# Patient Record
Sex: FEMALE | Race: ASIAN | Employment: OTHER | URBAN - METROPOLITAN AREA
[De-identification: names, ages, dates, MRNs, and addresses within clinical notes are randomized per-mention and may not be internally consistent; named-entity substitution may affect disease eponyms.]

---

## 2020-06-07 ENCOUNTER — HOSPITAL ENCOUNTER (INPATIENT)
Age: 85
LOS: 1 days | Discharge: NURSING, REHAB OR LONG-TERM CARE | DRG: 552 | End: 2020-06-09
Attending: EMERGENCY MEDICINE | Admitting: INTERNAL MEDICINE
Payer: MEDICARE

## 2020-06-07 ENCOUNTER — APPOINTMENT (OUTPATIENT)
Dept: CT IMAGING | Age: 85
DRG: 552 | End: 2020-06-07
Payer: MEDICARE

## 2020-06-07 ENCOUNTER — APPOINTMENT (OUTPATIENT)
Dept: GENERAL RADIOLOGY | Age: 85
DRG: 552 | End: 2020-06-07
Payer: MEDICARE
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
## 2020-06-07 LAB
ABSOLUTE EOS #: 0.07 K/UL (ref 0–0.44)
ABSOLUTE IMMATURE GRANULOCYTE: 0.08 K/UL (ref 0–0.3)
ABSOLUTE LYMPH #: 2.86 K/UL (ref 1.1–3.7)
ABSOLUTE MONO #: 0.44 K/UL (ref 0.1–1.2)
ANION GAP SERPL CALCULATED.3IONS-SCNC: 14 MMOL/L (ref 9–17)
BASOPHILS # BLD: 0 % (ref 0–2)
BASOPHILS ABSOLUTE: 0.03 K/UL (ref 0–0.2)
BUN BLDV-MCNC: 28 MG/DL (ref 8–23)
BUN/CREAT BLD: 44 (ref 9–20)
CALCIUM SERPL-MCNC: 9.1 MG/DL (ref 8.6–10.4)
CHLORIDE BLD-SCNC: 100 MMOL/L (ref 98–107)
CO2: 27 MMOL/L (ref 20–31)
CREAT SERPL-MCNC: 0.64 MG/DL (ref 0.5–0.9)
DIFFERENTIAL TYPE: ABNORMAL
EOSINOPHILS RELATIVE PERCENT: 1 % (ref 1–4)
GFR AFRICAN AMERICAN: >60 ML/MIN
GFR NON-AFRICAN AMERICAN: >60 ML/MIN
GFR SERPL CREATININE-BSD FRML MDRD: ABNORMAL ML/MIN/{1.73_M2}
GFR SERPL CREATININE-BSD FRML MDRD: ABNORMAL ML/MIN/{1.73_M2}
GLUCOSE BLD-MCNC: 112 MG/DL (ref 70–99)
HCT VFR BLD CALC: 37.8 % (ref 36.3–47.1)
HEMOGLOBIN: 12 G/DL (ref 11.9–15.1)
IMMATURE GRANULOCYTES: 1 %
INR BLD: 1
LYMPHOCYTES # BLD: 37 % (ref 24–43)
MCH RBC QN AUTO: 31 PG (ref 25.2–33.5)
MCHC RBC AUTO-ENTMCNC: 31.7 G/DL (ref 28.4–34.8)
MCV RBC AUTO: 97.7 FL (ref 82.6–102.9)
MONOCYTES # BLD: 6 % (ref 3–12)
NRBC AUTOMATED: 0 PER 100 WBC
PDW BLD-RTO: 13.1 % (ref 11.8–14.4)
PLATELET # BLD: 226 K/UL (ref 138–453)
PLATELET ESTIMATE: ABNORMAL
PMV BLD AUTO: 9.1 FL (ref 8.1–13.5)
POTASSIUM SERPL-SCNC: 3.8 MMOL/L (ref 3.7–5.3)
PROTHROMBIN TIME: 12.6 SEC (ref 11.5–14.2)
RBC # BLD: 3.87 M/UL (ref 3.95–5.11)
RBC # BLD: ABNORMAL 10*6/UL
SEG NEUTROPHILS: 55 % (ref 36–65)
SEGMENTED NEUTROPHILS ABSOLUTE COUNT: 4.17 K/UL (ref 1.5–8.1)
SODIUM BLD-SCNC: 141 MMOL/L (ref 135–144)
WBC # BLD: 7.7 K/UL (ref 3.5–11.3)
WBC # BLD: ABNORMAL 10*3/UL

## 2020-06-07 PROCEDURE — 72170 X-RAY EXAM OF PELVIS: CPT

## 2020-06-07 PROCEDURE — 0HQ1XZZ REPAIR FACE SKIN, EXTERNAL APPROACH: ICD-10-PCS | Performed by: EMERGENCY MEDICINE

## 2020-06-07 PROCEDURE — 71250 CT THORAX DX C-: CPT

## 2020-06-07 PROCEDURE — G0378 HOSPITAL OBSERVATION PER HR: HCPCS

## 2020-06-07 PROCEDURE — 72125 CT NECK SPINE W/O DYE: CPT

## 2020-06-07 PROCEDURE — 96372 THER/PROPH/DIAG INJ SC/IM: CPT

## 2020-06-07 PROCEDURE — 99219 PR INITIAL OBSERVATION CARE/DAY 50 MINUTES: CPT | Performed by: INTERNAL MEDICINE

## 2020-06-07 PROCEDURE — 73700 CT LOWER EXTREMITY W/O DYE: CPT

## 2020-06-07 PROCEDURE — 73030 X-RAY EXAM OF SHOULDER: CPT

## 2020-06-07 PROCEDURE — 2580000003 HC RX 258: Performed by: EMERGENCY MEDICINE

## 2020-06-07 PROCEDURE — 71045 X-RAY EXAM CHEST 1 VIEW: CPT

## 2020-06-07 PROCEDURE — 70486 CT MAXILLOFACIAL W/O DYE: CPT

## 2020-06-07 PROCEDURE — 70450 CT HEAD/BRAIN W/O DYE: CPT

## 2020-06-07 PROCEDURE — 6370000000 HC RX 637 (ALT 250 FOR IP): Performed by: NURSE PRACTITIONER

## 2020-06-07 PROCEDURE — 96361 HYDRATE IV INFUSION ADD-ON: CPT

## 2020-06-07 PROCEDURE — 96374 THER/PROPH/DIAG INJ IV PUSH: CPT

## 2020-06-07 PROCEDURE — 80048 BASIC METABOLIC PNL TOTAL CA: CPT

## 2020-06-07 PROCEDURE — 2580000003 HC RX 258: Performed by: NURSE PRACTITIONER

## 2020-06-07 PROCEDURE — 90715 TDAP VACCINE 7 YRS/> IM: CPT | Performed by: EMERGENCY MEDICINE

## 2020-06-07 PROCEDURE — APPNB15 APP NON BILLABLE TIME 0-15 MINS: Performed by: NURSE PRACTITIONER

## 2020-06-07 PROCEDURE — 90471 IMMUNIZATION ADMIN: CPT | Performed by: EMERGENCY MEDICINE

## 2020-06-07 PROCEDURE — 96375 TX/PRO/DX INJ NEW DRUG ADDON: CPT

## 2020-06-07 PROCEDURE — 6360000002 HC RX W HCPCS: Performed by: NURSE PRACTITIONER

## 2020-06-07 PROCEDURE — 99285 EMERGENCY DEPT VISIT HI MDM: CPT

## 2020-06-07 PROCEDURE — 6360000002 HC RX W HCPCS: Performed by: EMERGENCY MEDICINE

## 2020-06-07 PROCEDURE — 85610 PROTHROMBIN TIME: CPT

## 2020-06-07 PROCEDURE — 85025 COMPLETE CBC W/AUTO DIFF WBC: CPT

## 2020-06-07 RX ORDER — POLYETHYLENE GLYCOL 3350 17 G/17G
17 POWDER, FOR SOLUTION ORAL DAILY PRN
COMMUNITY

## 2020-06-07 RX ORDER — CALCIUM CARBONATE/VITAMIN D3 600 MG-10
1 TABLET ORAL 2 TIMES DAILY
Status: DISCONTINUED | OUTPATIENT
Start: 2020-06-07 | End: 2020-06-09 | Stop reason: HOSPADM

## 2020-06-07 RX ORDER — PROMETHAZINE HYDROCHLORIDE 12.5 MG/1
12.5 TABLET ORAL EVERY 6 HOURS PRN
Status: DISCONTINUED | OUTPATIENT
Start: 2020-06-07 | End: 2020-06-09 | Stop reason: HOSPADM

## 2020-06-07 RX ORDER — ONDANSETRON 2 MG/ML
4 INJECTION INTRAMUSCULAR; INTRAVENOUS EVERY 6 HOURS PRN
Status: DISCONTINUED | OUTPATIENT
Start: 2020-06-07 | End: 2020-06-09 | Stop reason: HOSPADM

## 2020-06-07 RX ORDER — POTASSIUM CHLORIDE 7.45 MG/ML
10 INJECTION INTRAVENOUS PRN
Status: DISCONTINUED | OUTPATIENT
Start: 2020-06-07 | End: 2020-06-09 | Stop reason: HOSPADM

## 2020-06-07 RX ORDER — SODIUM CHLORIDE 0.9 % (FLUSH) 0.9 %
10 SYRINGE (ML) INJECTION PRN
Status: DISCONTINUED | OUTPATIENT
Start: 2020-06-07 | End: 2020-06-09 | Stop reason: HOSPADM

## 2020-06-07 RX ORDER — VITAMIN B COMPLEX
2000 TABLET ORAL DAILY
Status: DISCONTINUED | OUTPATIENT
Start: 2020-06-07 | End: 2020-06-09 | Stop reason: HOSPADM

## 2020-06-07 RX ORDER — POTASSIUM CHLORIDE 20 MEQ/1
40 TABLET, EXTENDED RELEASE ORAL PRN
Status: DISCONTINUED | OUTPATIENT
Start: 2020-06-07 | End: 2020-06-09 | Stop reason: HOSPADM

## 2020-06-07 RX ORDER — ONDANSETRON 2 MG/ML
4 INJECTION INTRAMUSCULAR; INTRAVENOUS ONCE
Status: COMPLETED | OUTPATIENT
Start: 2020-06-07 | End: 2020-06-07

## 2020-06-07 RX ORDER — HYDROCODONE BITARTRATE AND ACETAMINOPHEN 5; 325 MG/1; MG/1
1 TABLET ORAL EVERY 4 HOURS PRN
Status: DISCONTINUED | OUTPATIENT
Start: 2020-06-07 | End: 2020-06-09 | Stop reason: HOSPADM

## 2020-06-07 RX ORDER — SODIUM CHLORIDE 0.9 % (FLUSH) 0.9 %
10 SYRINGE (ML) INJECTION EVERY 12 HOURS SCHEDULED
Status: DISCONTINUED | OUTPATIENT
Start: 2020-06-07 | End: 2020-06-09 | Stop reason: HOSPADM

## 2020-06-07 RX ORDER — SODIUM CHLORIDE 9 MG/ML
INJECTION, SOLUTION INTRAVENOUS CONTINUOUS
Status: DISCONTINUED | OUTPATIENT
Start: 2020-06-07 | End: 2020-06-07

## 2020-06-07 RX ORDER — ACETAMINOPHEN 325 MG/1
650 TABLET ORAL EVERY 6 HOURS PRN
Status: DISCONTINUED | OUTPATIENT
Start: 2020-06-07 | End: 2020-06-09 | Stop reason: HOSPADM

## 2020-06-07 RX ORDER — ACETAMINOPHEN 160 MG
2000 TABLET,DISINTEGRATING ORAL DAILY PRN
COMMUNITY

## 2020-06-07 RX ORDER — ACETAMINOPHEN 650 MG/1
650 SUPPOSITORY RECTAL EVERY 6 HOURS PRN
Status: DISCONTINUED | OUTPATIENT
Start: 2020-06-07 | End: 2020-06-09 | Stop reason: HOSPADM

## 2020-06-07 RX ORDER — OYSTER SHELL CALCIUM WITH VITAMIN D 500; 200 MG/1; [IU]/1
1 TABLET, FILM COATED ORAL 2 TIMES DAILY PRN
COMMUNITY

## 2020-06-07 RX ORDER — FENTANYL CITRATE 50 UG/ML
25 INJECTION, SOLUTION INTRAMUSCULAR; INTRAVENOUS ONCE
Status: COMPLETED | OUTPATIENT
Start: 2020-06-07 | End: 2020-06-07

## 2020-06-07 RX ORDER — FENTANYL CITRATE 50 UG/ML
25 INJECTION, SOLUTION INTRAMUSCULAR; INTRAVENOUS
Status: DISCONTINUED | OUTPATIENT
Start: 2020-06-07 | End: 2020-06-09 | Stop reason: HOSPADM

## 2020-06-07 RX ORDER — HYDROCODONE BITARTRATE AND ACETAMINOPHEN 5; 325 MG/1; MG/1
2 TABLET ORAL EVERY 4 HOURS PRN
Status: DISCONTINUED | OUTPATIENT
Start: 2020-06-07 | End: 2020-06-09 | Stop reason: HOSPADM

## 2020-06-07 RX ORDER — POLYETHYLENE GLYCOL 3350 17 G/17G
17 POWDER, FOR SOLUTION ORAL DAILY PRN
Status: DISCONTINUED | OUTPATIENT
Start: 2020-06-07 | End: 2020-06-09 | Stop reason: HOSPADM

## 2020-06-07 RX ORDER — NICOTINE 21 MG/24HR
1 PATCH, TRANSDERMAL 24 HOURS TRANSDERMAL DAILY PRN
Status: DISCONTINUED | OUTPATIENT
Start: 2020-06-07 | End: 2020-06-09 | Stop reason: HOSPADM

## 2020-06-07 RX ORDER — MAGNESIUM SULFATE 1 G/100ML
1 INJECTION INTRAVENOUS PRN
Status: DISCONTINUED | OUTPATIENT
Start: 2020-06-07 | End: 2020-06-09 | Stop reason: HOSPADM

## 2020-06-07 RX ADMIN — HYDROCODONE BITARTRATE AND ACETAMINOPHEN 1 TABLET: 5; 325 TABLET ORAL at 09:18

## 2020-06-07 RX ADMIN — SODIUM CHLORIDE, PRESERVATIVE FREE 10 ML: 5 INJECTION INTRAVENOUS at 20:11

## 2020-06-07 RX ADMIN — Medication 1 TABLET: at 20:10

## 2020-06-07 RX ADMIN — FENTANYL CITRATE 25 MCG: 50 INJECTION, SOLUTION INTRAMUSCULAR; INTRAVENOUS at 02:54

## 2020-06-07 RX ADMIN — Medication 1 TABLET: at 09:18

## 2020-06-07 RX ADMIN — SODIUM CHLORIDE: 9 INJECTION, SOLUTION INTRAVENOUS at 03:03

## 2020-06-07 RX ADMIN — MELATONIN 2000 UNITS: at 09:18

## 2020-06-07 RX ADMIN — TETANUS TOXOID, REDUCED DIPHTHERIA TOXOID AND ACELLULAR PERTUSSIS VACCINE, ADSORBED 0.5 ML: 5; 2.5; 8; 8; 2.5 SUSPENSION INTRAMUSCULAR at 04:20

## 2020-06-07 RX ADMIN — ENOXAPARIN SODIUM 30 MG: 30 INJECTION SUBCUTANEOUS at 15:58

## 2020-06-07 RX ADMIN — ONDANSETRON 4 MG: 2 INJECTION INTRAMUSCULAR; INTRAVENOUS at 02:54

## 2020-06-07 RX ADMIN — SODIUM CHLORIDE, PRESERVATIVE FREE 10 ML: 5 INJECTION INTRAVENOUS at 16:01

## 2020-06-07 RX ADMIN — HYDROCODONE BITARTRATE AND ACETAMINOPHEN 1 TABLET: 5; 325 TABLET ORAL at 15:58

## 2020-06-07 ASSESSMENT — PAIN DESCRIPTION - PROGRESSION: CLINICAL_PROGRESSION: NOT CHANGED

## 2020-06-07 ASSESSMENT — PAIN DESCRIPTION - LOCATION: LOCATION: SHOULDER

## 2020-06-07 ASSESSMENT — PAIN DESCRIPTION - FREQUENCY: FREQUENCY: CONTINUOUS

## 2020-06-07 ASSESSMENT — PAIN - FUNCTIONAL ASSESSMENT: PAIN_FUNCTIONAL_ASSESSMENT: PREVENTS OR INTERFERES WITH ALL ACTIVE AND SOME PASSIVE ACTIVITIES

## 2020-06-07 ASSESSMENT — PAIN SCALES - GENERAL
PAINLEVEL_OUTOF10: 7
PAINLEVEL_OUTOF10: 6
PAINLEVEL_OUTOF10: 8

## 2020-06-07 ASSESSMENT — PAIN DESCRIPTION - DESCRIPTORS: DESCRIPTORS: SHARP;POUNDING

## 2020-06-07 ASSESSMENT — PAIN DESCRIPTION - ORIENTATION: ORIENTATION: RIGHT

## 2020-06-07 ASSESSMENT — PAIN DESCRIPTION - ONSET: ONSET: ON-GOING

## 2020-06-07 ASSESSMENT — PAIN DESCRIPTION - PAIN TYPE: TYPE: ACUTE PAIN

## 2020-06-07 NOTE — ED PROVIDER NOTES
[address] [place] [address] ED  eMERGENCY dEPARTMENT eNCOUnter      Pt Name: Isac Gracia  MRN: [id number]  [place] 11/6/1927  Date of evaluation: 6/7/2020  Provider: Bianka Soni MD    CHIEF COMPLAINT       Chief Complaint   Patient presents with    Fall    Laceration         HISTORY OF PRESENT ILLNESS  (Location/Symptom, Timing/Onset, Context/Setting, Quality, Duration, Modifying Factors, Severity.)   Viki Arroyo is a 80 y.o. female who presents to the emergency department for evaluation of injuries sustained when she fell. Patient lives in a private dwelling with family. She was up this morning when she slipped and fell. She denies syncope presyncope. She states she fell and struck her face and head. She also has pain to the right shoulder and posterior chest wall region. The laceration on her forehead and has a small amount of bleeding      Nursing Notes were reviewed. ALLERGIES     Patient has no known allergies. CURRENT MEDICATIONS       Previous Medications    CALCIUM-VITAMIN D (OSCAL-500) 500-200 MG-UNIT PER TABLET    Take 1 tablet by mouth 2 times daily    CHOLECALCIFEROL (VITAMIN D3) 50 MCG (2000 UT) CAPS    Take 2,000 Units by mouth daily    POTASSIUM CHLORIDE CRISTIAN ER (K-DUR PO)    Take 20 mEq by mouth daily       PAST MEDICAL HISTORY         Diagnosis Date    Debility     Hiatal hernia     Osteoarthritis of left knee     Osteoporosis     Sensorineural hearing loss     Small bowel obstruction ([address]     Urinary tract infection with hematuria        SURGICAL HISTORY           Procedure Laterality Date    APPENDECTOMY      HYSTERECTOMY      MASTECTOMY Right          FAMILY HISTORY     History reviewed. No pertinent family history. No family status information on file. SOCIAL HISTORY      reports that she has never smoked. She has never used smokeless tobacco. She reports previous alcohol use. She reports that she does not use drugs.     REVIEW OF SYSTEMS    (2-9 systems for level 4, 10 or more for level 5)     Review of Systems   All other systems reviewed and are negative. Except as noted above the remainder of the review of systems was reviewed and negative. PHYSICAL EXAM    (up to 7 for level 4, 8 or more for level 5)     Vitals:    06/07/20 0229   BP: (!) 155/78   Pulse: 72   Resp: 18   Temp: 98.2 °F (36.8 °C)   TempSrc: Oral   SpO2: 93%   Weight: 90 lb (40.8 kg)   Height: 4' 8\" (1.422 m)       Physical exam reflects a pleasant elderly female. She is afebrile with stable vital signs to include pulse ox of 93% on room air. She is not hypoxic. She is alert conversive and appropriate in behavior. She has significant facial contusions on the right side of her face. She has a 2 and half centimeter laceration/skin tear horizontal in orientation on the right side of the mid forehead. No ocular injury. No nasal malalignment epistaxis otorrhea or rhinorrhea. No hemotympanum she has severe kyphosis. She is tender on palpation of the right scapula right shoulder region diffusely. She will not move her right arm. No bruising to the chest wall. Heart regular rate and rhythm normal S1-S2 no murmurs rubs gallops. Lungs are clear although diminished abdomen is soft throughout pelvis is stable no focal hip discomfort. Left upper extremity and bilateral lower extremities show no deformities bony point tenderness decreased range of motion or complaint of pain    DIAGNOSTIC RESULTS         RADIOLOGY:   Non-plain film images such as CT, Ultrasound and MRI are read by the radiologist. Plain radiographic images are visualized and preliminarily interpreted by the emergency physician with the below findings:      Interpretation per the Radiologist below, if available at the time of this note:    CT Head WO Contrast   Final Result   Atrophy without acute intracranial abnormality. Right forehead soft tissue swelling and laceration.          CT Cervical Spine WO Contrast   Final Result

## 2020-06-07 NOTE — H&P
[facility]    HISTORY AND PHYSICAL EXAMINATION            Date:   6/7/2020  Patient name:  Jose Smith  Date of admission:  6/7/2020  2:29 AM  MRN:   [id number]  Account:  [de-identified]  YOB: 1927  PCP:    Nuiba Arnold MD  Room:   2001/2001-02  Code Status:    Full Code    Chief Complaint:     Chief Complaint   Patient presents with    Fall    Laceration       History Obtained From:     patient, family member - niece, electronic medical record    History of Present Illness:     Viki Toledo is a 80 y.o. Non-/non  female who presents with Fall and Laceration   and is admitted to the hospital for the management of Fall at home, initial encounter. This 80 yof was warming up coffee in the microwave and lost her balance and fell to ground. She sustained scapular fracture, and possibly dens fracture too. Has pain all over, especially in right shoulder    Past Medical History:     Past Medical History:   Diagnosis Date    Debility     Hiatal hernia     Osteoarthritis of left knee     Osteoporosis     Sensorineural hearing loss     Small bowel obstruction ([address]     Urinary tract infection with hematuria         Past Surgical History:     Past Surgical History:   Procedure Laterality Date    APPENDECTOMY      HYSTERECTOMY      MASTECTOMY Right         Medications Prior to Admission:     Prior to Admission medications    Medication Sig Start Date End Date Taking?  Authorizing Provider   Potassium Chloride Chen ER (K-DUR PO) Take 20 mEq by mouth daily   Yes Historical Provider, MD   calcium-vitamin D (OSCAL-500) 500-200 MG-UNIT per tablet Take 1 tablet by mouth 2 times daily   Yes Historical Provider, MD   Cholecalciferol (VITAMIN D3) 50 MCG (2000 UT) CAPS Take 2,000 Units by mouth daily   Yes Historical Provider, MD   polyethylene glycol (GLYCOLAX) 17 g packet Take 17 g by mouth daily   Yes Historical Provider, MD Allergies:     Patient has no known allergies. Social History:     Tobacco:    reports that she has never smoked. She has never used smokeless tobacco.  Alcohol:      reports previous alcohol use. Drug Use:  reports no history of drug use. Family History:     History reviewed. No pertinent family history. Review of Systems:     Positive and Negative as described in HPI. CONSTITUTIONAL:  negative for fevers, chills, sweats, fatigue, weight loss  HEENT:  negative for vision, hearing changes, runny nose, throat pain  RESPIRATORY:  negative for shortness of breath, cough, congestion, wheezing  CARDIOVASCULAR:  negative for chest pain, palpitations  GASTROINTESTINAL:  negative for nausea, vomiting, diarrhea, constipation, change in bowel habits, abdominal pain   GENITOURINARY:  negative for difficulty of urination, burning with urination, frequency   INTEGUMENT:  negative for rash, skin lesions, easy bruising   HEMATOLOGIC/LYMPHATIC:  negative for swelling/edema   ALLERGIC/IMMUNOLOGIC:  negative for urticaria , itching  ENDOCRINE:  negative increase in drinking, increase in urination, hot or cold intolerance  MUSCULOSKELETAL:  negative swelling of joints  NEUROLOGICAL:  negative for headaches, dizziness, lightheadedness, numbness, tingling extremities  BEHAVIOR/PSYCH:  negative for depression, anxiety    Physical Exam:   BP (!) 141/68   Pulse 66   Temp 97.6 °F (36.4 °C) (Oral)   Resp 16   Ht 4' 8\" (1.422 m)   Wt 79 lb (35.8 kg)   SpO2 96%   BMI 17.71 kg/m²   Temp (24hrs), Av.9 °F (36.6 °C), Min:97.6 °F (36.4 °C), Max:98.2 °F (36.8 °C)    No results for input(s): POCGLU in the last 72 hours.     Intake/Output Summary (Last 24 hours) at 2020 1311  Last data filed at 2020 1034  Gross per 24 hour   Intake 1200 ml   Output 300 ml   Net 900 ml       General Appearance:  alert, well appearing, and in no acute distress  Mental status: oriented to person, place, and time  Head:  normocephalic, NOT REPORTED    Basic Metabolic Panel    Collection Time: 06/07/20  2:59 AM   Result Value Ref Range    Glucose 112 (H) 70 - 99 mg/dL    BUN 28 (H) 8 - 23 mg/dL    CREATININE 0.64 0.50 - 0.90 mg/dL    Bun/Cre Ratio 44 (H) 9 - 20    Calcium 9.1 8.6 - 10.4 mg/dL    Sodium 141 135 - 144 mmol/L    Potassium 3.8 3.7 - 5.3 mmol/L    Chloride 100 98 - 107 mmol/L    CO2 27 20 - 31 mmol/L    Anion Gap 14 9 - 17 mmol/L    GFR Non-African American >60 >60 mL/min    GFR African American >60 >60 mL/min    GFR Comment          GFR Staging NOT REPORTED    Protime-INR    Collection Time: 06/07/20  2:59 AM   Result Value Ref Range    Protime 12.6 11.5 - 14.2 sec    INR 1.0        Imaging/Diagnostics:  Xr Pelvis (1-2 Views)    Result Date: 6/7/2020  Likely impacted fracture involving the left femoral neck. Dedicated left hip views may be helpful in further evaluation. Xr Shoulder Right (min 2 Views)    Result Date: 6/7/2020  Age-indeterminate fractures involving the right 3rd, 4th, and 5th ribs. Comminuted fracture involving the inferior aspect of the right scapula. Ct Head Wo Contrast    Result Date: 6/7/2020  Atrophy without acute intracranial abnormality. Right forehead soft tissue swelling and laceration. Ct Facial Bones Wo Contrast    Result Date: 6/7/2020  No acute traumatic injury of the facial bones. Nondisplaced fracture at the tip of the dens. Ct Chest Wo Contrast    Addendum Date: 6/7/2020    ADDENDUM: There is redemonstration of age-indeterminate right 3rd, 4th, and 5th rib fractures. There is also redemonstration of a comminuted right scapular fracture inferiorly. Result Date: 6/7/2020  Atelectasis in the lung bases. Large hiatal hernia. Remote compression deformity at L1 status post kyphoplasty. Age-indeterminate T5 compression deformity. Ct Cervical Spine Wo Contrast    Result Date: 6/7/2020  Potential nondisplaced fracture at the tip of the dens.      Xr Chest Portable    Result Date: 6/7/2020  Chronic pulmonary change without acute cardiopulmonary process. Hiatal hernia. Ct Hip Left Wo Contrast    Result Date: 6/7/2020  No acute osseous or soft tissue abnormality. Degenerative change of the SI joints and left hip joint. Assessment :      Hospital Problems           Last Modified POA    * (Principal) Fall at home, initial encounter 6/7/2020 Yes    Dens fracture, closed, initial encounter ([facility] [address] 6/7/2020 Yes    Overview Signed 6/7/2020  6:39 AM by CORINNE Tee CNP     chronic         Closed fracture of right scapula 6/7/2020 Yes    Closed fracture of multiple ribs 6/7/2020 Yes                Plan:     Patient status observation in the Med/Surge    1. Ortho consult of scapula fracture  2. NS consult for possible dens fracture, likely needs c collar  3. D/w niece  4. Pain control  5.  Hold off on pt/ot until cleared by ortho and NS    Consultations:   IP CONSULT TO INTERNAL MEDICINE  IP CONSULT TO SOCIAL WORK        Jolinda Closs Blood,   6/7/2020  1:11 PM    Copy sent to Dr. Ross Aguilar MD

## 2020-06-07 NOTE — PROGRESS NOTES
Talked with niece, Leticia Lewis who says she is POA, answered some questions to help with completion of the history admission, also reminded to bring in pt's living will/classification paperwork and the POA papers and she voiced  understanding

## 2020-06-07 NOTE — PROGRESS NOTES
Talked with Odalis Yang and gave condition update, made her aware of the new consults for  neuro surgery and ortho

## 2020-06-08 LAB
ANION GAP SERPL CALCULATED.3IONS-SCNC: 13 MMOL/L (ref 9–17)
BUN BLDV-MCNC: 20 MG/DL (ref 8–23)
BUN/CREAT BLD: 34 (ref 9–20)
CALCIUM SERPL-MCNC: 9.4 MG/DL (ref 8.6–10.4)
CHLORIDE BLD-SCNC: 101 MMOL/L (ref 98–107)
CO2: 26 MMOL/L (ref 20–31)
CREAT SERPL-MCNC: 0.58 MG/DL (ref 0.5–0.9)
GFR AFRICAN AMERICAN: >60 ML/MIN
GFR NON-AFRICAN AMERICAN: >60 ML/MIN
GFR SERPL CREATININE-BSD FRML MDRD: ABNORMAL ML/MIN/{1.73_M2}
GFR SERPL CREATININE-BSD FRML MDRD: ABNORMAL ML/MIN/{1.73_M2}
GLUCOSE BLD-MCNC: 110 MG/DL (ref 70–99)
HCT VFR BLD CALC: 38.3 % (ref 36.3–47.1)
HEMOGLOBIN: 12.5 G/DL (ref 11.9–15.1)
INR BLD: 1
MCH RBC QN AUTO: 31.6 PG (ref 25.2–33.5)
MCHC RBC AUTO-ENTMCNC: 32.6 G/DL (ref 28.4–34.8)
MCV RBC AUTO: 96.7 FL (ref 82.6–102.9)
NRBC AUTOMATED: 0 PER 100 WBC
PDW BLD-RTO: 12.9 % (ref 11.8–14.4)
PLATELET # BLD: 202 K/UL (ref 138–453)
PMV BLD AUTO: 8.7 FL (ref 8.1–13.5)
POTASSIUM SERPL-SCNC: 3.9 MMOL/L (ref 3.7–5.3)
PROTHROMBIN TIME: 13.1 SEC (ref 11.5–14.2)
RBC # BLD: 3.96 M/UL (ref 3.95–5.11)
SARS-COV-2, PCR: NORMAL
SARS-COV-2, RAPID: NOT DETECTED
SARS-COV-2: NORMAL
SODIUM BLD-SCNC: 140 MMOL/L (ref 135–144)
SOURCE: NORMAL
WBC # BLD: 8.4 K/UL (ref 3.5–11.3)

## 2020-06-08 PROCEDURE — 85027 COMPLETE CBC AUTOMATED: CPT

## 2020-06-08 PROCEDURE — 6360000002 HC RX W HCPCS: Performed by: NURSE PRACTITIONER

## 2020-06-08 PROCEDURE — U0002 COVID-19 LAB TEST NON-CDC: HCPCS

## 2020-06-08 PROCEDURE — G0378 HOSPITAL OBSERVATION PER HR: HCPCS

## 2020-06-08 PROCEDURE — 80048 BASIC METABOLIC PNL TOTAL CA: CPT

## 2020-06-08 PROCEDURE — 6370000000 HC RX 637 (ALT 250 FOR IP): Performed by: NURSE PRACTITIONER

## 2020-06-08 PROCEDURE — 99225 PR SBSQ OBSERVATION CARE/DAY 25 MINUTES: CPT | Performed by: INTERNAL MEDICINE

## 2020-06-08 PROCEDURE — 96372 THER/PROPH/DIAG INJ SC/IM: CPT

## 2020-06-08 PROCEDURE — 85610 PROTHROMBIN TIME: CPT

## 2020-06-08 PROCEDURE — 99221 1ST HOSP IP/OBS SF/LOW 40: CPT | Performed by: PHYSICIAN ASSISTANT

## 2020-06-08 PROCEDURE — 2580000003 HC RX 258: Performed by: NURSE PRACTITIONER

## 2020-06-08 PROCEDURE — 36415 COLL VENOUS BLD VENIPUNCTURE: CPT

## 2020-06-08 RX ORDER — ACETAMINOPHEN 160 MG/5ML
650 SOLUTION ORAL EVERY 4 HOURS PRN
Status: DISCONTINUED | OUTPATIENT
Start: 2020-06-08 | End: 2020-06-09 | Stop reason: HOSPADM

## 2020-06-08 RX ADMIN — SODIUM CHLORIDE, PRESERVATIVE FREE 10 ML: 5 INJECTION INTRAVENOUS at 13:19

## 2020-06-08 RX ADMIN — Medication 1 TABLET: at 21:00

## 2020-06-08 RX ADMIN — POLYETHYLENE GLYCOL (3350) 17 G: 17 POWDER, FOR SOLUTION ORAL at 22:40

## 2020-06-08 RX ADMIN — Medication 1 TABLET: at 12:33

## 2020-06-08 RX ADMIN — ENOXAPARIN SODIUM 30 MG: 30 INJECTION SUBCUTANEOUS at 16:01

## 2020-06-08 RX ADMIN — ACETAMINOPHEN 650 MG: 325 SOLUTION ORAL at 13:18

## 2020-06-08 RX ADMIN — SODIUM CHLORIDE, PRESERVATIVE FREE 10 ML: 5 INJECTION INTRAVENOUS at 21:01

## 2020-06-08 RX ADMIN — HYDROCODONE BITARTRATE AND ACETAMINOPHEN 2 TABLET: 5; 325 TABLET ORAL at 22:40

## 2020-06-08 RX ADMIN — MELATONIN 2000 UNITS: at 12:33

## 2020-06-08 ASSESSMENT — PAIN DESCRIPTION - PAIN TYPE
TYPE: CHRONIC PAIN
TYPE: ACUTE PAIN

## 2020-06-08 ASSESSMENT — PAIN SCALES - GENERAL
PAINLEVEL_OUTOF10: 3
PAINLEVEL_OUTOF10: 0
PAINLEVEL_OUTOF10: 8

## 2020-06-08 ASSESSMENT — PAIN DESCRIPTION - FREQUENCY
FREQUENCY: INTERMITTENT
FREQUENCY: INTERMITTENT

## 2020-06-08 ASSESSMENT — PAIN DESCRIPTION - ONSET
ONSET: ON-GOING
ONSET: ON-GOING

## 2020-06-08 ASSESSMENT — PAIN DESCRIPTION - LOCATION
LOCATION: SHOULDER
LOCATION: BACK

## 2020-06-08 ASSESSMENT — PAIN DESCRIPTION - DESCRIPTORS
DESCRIPTORS: ACHING
DESCRIPTORS: ACHING

## 2020-06-08 ASSESSMENT — PAIN - FUNCTIONAL ASSESSMENT
PAIN_FUNCTIONAL_ASSESSMENT: PREVENTS OR INTERFERES WITH ALL ACTIVE AND SOME PASSIVE ACTIVITIES
PAIN_FUNCTIONAL_ASSESSMENT: PREVENTS OR INTERFERES SOME ACTIVE ACTIVITIES AND ADLS

## 2020-06-08 ASSESSMENT — PAIN DESCRIPTION - ORIENTATION: ORIENTATION: RIGHT

## 2020-06-08 ASSESSMENT — PAIN DESCRIPTION - PROGRESSION: CLINICAL_PROGRESSION: NOT CHANGED

## 2020-06-08 NOTE — PLAN OF CARE
Problem: Pain:  Description: Pain management should include both nonpharmacologic and pharmacologic interventions. Goal: Pain level will decrease  Description: Pain level will decrease  Outcome: Ongoing  Goal: Control of acute pain  Description: Control of acute pain  Outcome: Ongoing  Goal: Control of chronic pain  Description: Control of chronic pain  Outcome: Ongoing     Problem: Falls - Risk of:  Goal: Will remain free from falls  Description: Will remain free from falls  Outcome: Ongoing  Goal: Absence of physical injury  Description: Absence of physical injury  Outcome: Ongoing     Problem: SAFETY  Goal: Free from accidental physical injury  Outcome: Ongoing     Problem: DAILY CARE  Goal: Daily care needs are met  Outcome: Ongoing     Problem: PAIN  Goal: Patient's pain/discomfort is manageable  Outcome: Ongoing     Problem: SKIN INTEGRITY  Goal: Skin integrity is maintained or improved  Outcome: Ongoing     Problem: KNOWLEDGE DEFICIT  Goal: Patient/S.O. demonstrates understanding of disease process, treatment plan, medications, and discharge instructions.   Outcome: Ongoing     Problem: DISCHARGE BARRIERS  Goal: Patient's continuum of care needs are met  Outcome: Ongoing

## 2020-06-08 NOTE — PROGRESS NOTES
Transitions of Care Pharmacy Service   Medication Review    The patient's list of current home medications has been reviewed. Per family, pt only takes her supplements when she becomes constipated. She also does not take her Klor-con daily as prescribed, instead just when she becomes constipated. Otoniel confirms pt has not filled any prescription meds since Jan 2020. Source(s) of information: patient's niece, Otoniel, Care Everywhere      Please feel free to call me with any questions about this encounter. Thank you.     America Lawson [address] [place] [address]   Transitions of Care Pharmacy Service  Phone:  [phone]  Fax: [phone]      Electronically signed by America Lawson [address] [place] [address] on 6/8/2020 at 4:28 PM           Medications Prior to Admission:   Potassium Chloride Chen ER (K-DUR PO), Take 20 mEq by mouth daily  calcium-vitamin D (OSCAL-500) 500-200 MG-UNIT per tablet, Take 1 tablet by mouth 2 times daily as needed (constipation)   Cholecalciferol (VITAMIN D3) 50 MCG (2000 UT) CAPS, Take 2,000 Units by mouth daily as needed (constipation)   polyethylene glycol (GLYCOLAX) 17 g packet, Take 17 g by mouth daily as needed for Constipation

## 2020-06-08 NOTE — CONSULTS
nausea/vomiting, fevers, chills, seizures, LOC . General ROS: negative for - chills, fatigue, fever or night sweats  Hematological and Lymphatic ROS: negative for - bleeding problems   Respiratory ROS: no cough, shortness of breath, or wheezing   Cardiovascular ROS: no chest pain or dyspnea on exertion   Gastrointestinal ROS: no abdominal pain, change in bowel habits  Musculoskeletal ROS: positive for - pain in limb   Neurological ROS: negative for - numbness/tingling or weakness     PHYSICAL EXAM:  BP (!) 140/73   Pulse 76   Temp 98.1 °F (36.7 °C) (Oral)   Resp 16   Ht 4' 8\" (1.422 m)   Wt 74 lb 8 oz (33.8 kg)   SpO2 95%   BMI 16.70 kg/m²     Gen: alert and oriented  Head: normocephalic, forehead laceration   Neck: supple  Chest: symmetric chest excursion, no respiratory distress  Heart: palpable pulse   Abdomen: nondistended  Pelvis: stable to AP and lateral compression  Right hip: can active SLR without discomfort, Neurocirc status checked and intact, ROM: 90/40/20/0/30 without complaint. Right Shoulder: Pain to palpation of fracture site. Cannot actively forward flexion of her right shoulder due to discomfort. Passively forward elevation to 90 without much discomfort. 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 Neurocirculatory status was checked and intact. LABS:  Recent Labs     06/08/20  0524   WBC 8.4   HGB 12.5   HCT 38.3      INR 1.0      K 3.9   BUN 20   CREATININE 0.58   GLUCOSE 110*        Radiology:   Xr Pelvis (1-2 Views)    Result Date: 6/7/2020  EXAMINATION: ONE XRAY VIEW OF THE PELVIS 6/7/2020 3:31 am COMPARISON: None. HISTORY: ORDERING SYSTEM PROVIDED HISTORY: trauma TECHNOLOGIST PROVIDED HISTORY: trauma Reason for Exam: patient fell, this morning. Acuity: Unknown Type of Exam: Unknown FINDINGS: There is a likely impacted fracture of the left femoral neck. Bony pelvis is intact. There is degenerative change of the lower lumbar spine.   There is degenerative change of the SI joints and left hip joint. There is a right hip arthroplasty. The surrounding soft tissues are unremarkable. Likely impacted fracture involving the left femoral neck. Dedicated left hip views may be helpful in further evaluation. Xr Shoulder Right (min 2 Views)    Result Date: 6/7/2020  EXAMINATION: THREE XRAY VIEWS OF THE RIGHT SHOULDER 6/7/2020 3:31 am COMPARISON: None. HISTORY: ORDERING SYSTEM PROVIDED HISTORY: Pain TECHNOLOGIST PROVIDED HISTORY: Pain Reason for Exam: patient fell, this morning. right shoulder area pain. Acuity: Unknown Type of Exam: Unknown FINDINGS: There are age-indeterminate fractures involving the right 3rd, 4th, and 5th ribs. There may be a comminuted fracture of the inferior aspect of the right scapula. There is degenerative change of the right shoulder joint spaces. The surrounding soft tissues are unremarkable. Age-indeterminate fractures involving the right 3rd, 4th, and 5th ribs. Comminuted fracture involving the inferior aspect of the right scapula. Ct Head Wo Contrast    Result Date: 6/7/2020  EXAMINATION: CT OF THE HEAD WITHOUT CONTRAST  6/7/2020 4:05 am TECHNIQUE: CT of the head was performed without the administration of intravenous contrast. Dose modulation, iterative reconstruction, and/or weight based adjustment of the mA/kV was utilized to reduce the radiation dose to as low as reasonably achievable. COMPARISON: None. HISTORY: ORDERING SYSTEM PROVIDED HISTORY: Trauma TECHNOLOGIST PROVIDED HISTORY: Trauma Reason for Exam: hit head Acuity: Acute Type of Exam: Initial Mechanism of Injury: fall FINDINGS: BRAIN/VENTRICLES: There is no acute intracranial hemorrhage, mass effect, or midline shift. There is satisfactory overall gray-white matter differentiation. There is atrophy. The ventricular structures are symmetric and unremarkable. The infratentorial structures are unremarkable.  ORBITS: The visualized portion of the orbits demonstrate no acute abnormality. SINUSES: The visualized paranasal sinuses and mastoid air cells demonstrate no acute abnormality. SOFT TISSUES/SKULL:  There is a right forehead laceration and soft tissue swelling. There is no acute osseous abnormality. Atrophy without acute intracranial abnormality. Right forehead soft tissue swelling and laceration. Ct Facial Bones Wo Contrast    Result Date: 6/7/2020  EXAMINATION: CT OF THE FACE WITHOUT CONTRAST  6/7/2020 4:03 am TECHNIQUE: CT of the face was performed without the administration of intravenous contrast. Multiplanar reformatted images are provided for review. Dose modulation, iterative reconstruction, and/or weight based adjustment of the mA/kV was utilized to reduce the radiation dose to as low as reasonably achievable. COMPARISON: None HISTORY: ORDERING SYSTEM PROVIDED HISTORY: trauma TECHNOLOGIST PROVIDED HISTORY: trauma Reason for Exam: hit head Acuity: Acute Type of Exam: Initial Mechanism of Injury: fall FINDINGS: FACIAL BONES:  The maxilla, pterygoid plates and zygomatic arches are intact. The mandible is intact. The mandibular condyles are normally situated. The nasal bones and maxillary nasal processes are intact. ORBITS:  The globes appear intact. The extraocular muscles, optic nerve sheath complexes and lacrimal glands appear unremarkable. No retrobulbar hematoma or mass is seen. The orbital walls and rims are intact. SINUSES/MASTOIDS:  The paranasal sinuses and mastoid air cells are well aerated. No acute fracture is seen. SOFT TISSUES:  No appreciable facial soft tissue swelling is seen. There is redemonstration of a nondisplaced fracture at the tip of the dens. No acute traumatic injury of the facial bones. Nondisplaced fracture at the tip of the dens. Ct Chest Wo Contrast    Addendum Date: 6/7/2020    ADDENDUM: There is redemonstration of age-indeterminate right 3rd, 4th, and 5th rib fractures.   There is also redemonstration of a comminuted right COMPARISON: None. HISTORY ORDERING SYSTEM PROVIDED HISTORY: Pain trauma TECHNOLOGIST PROVIDED HISTORY: Pain trauma FINDINGS: Bones: There is diffuse osteopenia. There is no acute osseous abnormality. There is a right hip arthroplasty without evidence for complication. Soft Tissue:  The visualized bowel loops are without evidence for obstruction. There is no free air or free fluid. There is no adenopathy. The urinary bladder is mildly distended. The surrounding soft tissues are unremarkable. Joint:  There is degenerative change of the visualized lower lumbar spine. There is degenerative change of SI joints bilaterally. There is degenerative change of the left hip joint. No acute osseous or soft tissue abnormality. Degenerative change of the SI joints and left hip joint. Assessment:   Patient Active Problem List   Diagnosis    Fall at home, initial encounter    Dens fracture, closed, initial encounter ([facility] [address]    Closed fracture of right scapula    Closed fracture of multiple ribs       80 y.o. female with right scapula fracture and multiple rib fractures    Plan:  1. Sling for comfort for non-surgical scapula fracture  2. Pain control  3. Neurosurgery to review possible dens fracture  4.   Okay for ambulation with assist      Electronically signed by Lary Harvey PA-C on 6/8/2020 at 8:55 AM

## 2020-06-08 NOTE — CONSULTS
Consults     44-year-old woman who fell yesterday had a fractured scapula and some fractured ribs on the right side CT scan scan was done of the neck which shows a very small fracture at the tip of the dens of. She does complain of some neck pain she has had no radicular arm pain numbness in hands. According to her daughter she lives at home with her normally she is ambulatory with a walker and generally actually has a torticollis at home to the right side. Physical exam:    She is awake alert and oriented does appear to be some tenderness in her neck she is sitting with torticollis to the left side strength is absolutely normal sensation is intact. Graphic studies  I reviewed the CT of her head and neck there is a small fracture through the tip of the dens do not see any C1-C2 sublux that would explain the torticollis that she is having. Recommendations  I would treat her with analgesics I do not see any reason for any external stabilization if she starts having more neck pain than I would be happy to see her in the office start physical therapy and gradually increase her activity up.

## 2020-06-08 NOTE — CARE COORDINATION
Case Management Initial Discharge Plan  June Kimura,         Readmission Risk              Risk of Unplanned Readmission:        0             Met with:family member patient and niece, Diya Granda to discuss discharge plans. Information verified: address, contacts, phone number, , insurance Yes  PCP: Jumana Hernandez MD  Date of last visit: last year    Insurance Provider: Medicare/Cindy    Discharge Planning  Current Residence:     Living Arrangements:  Other (Comment), Family Members(lives with neice)   Home has one stories/3 stairs to climb  Support Systems:  Family Members  Current Services PTA:    Supplier: none  Patient able to perform ADL's:Assisted  DME used to aid ambulation prior to admission: walker/grab bars around toilet and shower/during admissionTBD    Potential Assistance Needed:  Extended [facility], [facility]: Otoniel   Potential Assistance Purchasing Medications:  No  Does patient want to participate in local refill/ meds to beds program?  No    Patient agreeable to home care: No  [place] of choice provided:  n/a      Type of Home Care Services:  None  Patient expects to be discharged to:  unknown at this time    Prior SNF/Rehab Placement and Facility: yes, [facility] and [facility]  Agreeable to SNF/Rehab: Yes  [place] of choice provided: yes   Evaluation: yes    Expected Discharge date: Follow Up Appointment: Best Day/ Time:      Transportation provider: [facility]  Transportation arrangements needed for discharge: Yes    Discharge Plan: Met with patient's nieceDiya. Patient lives with niece in one level home. She was independent with walker. Niece is requesting short term SNF for rehab. The Plan for Transition of Care is related to the following treatment goals: SNF for PT/OT and skilled nursing    The Patient and/or patient representative niece was provided with a choice of provider and agrees   with the discharge plan.  [x] Yes [] No    Freedom of choice list was provided with basic dialogue that supports the patient's individualized plan of care/goals, treatment preferences and shares the quality data associated with the providers. [x] Yes [] No. [place] is reviewing list and will provide  with choices.  [address]    Electronically signed by MATTHEW Mcgrath on 6/8/20 at 12:06 PM EDT

## 2020-06-08 NOTE — CARE COORDINATION
Social Work-Renny is requesting 1) Ron, [address], 3) [address] [place] [address]. Explained that [facility] is for dementia patients, Kami did not approve due to observation status, [address] [place] [address] approved patient. Discussed with renny. She is agreeable. [address] [place] [address] will admit at [facility].  Estel Moment

## 2020-06-08 NOTE — PROGRESS NOTES
[facility]    Progress Note    6/8/2020    1:48 PM    Name:   Shyanne Gutierrez  MRN:     [id number]     Acct:      [de-identified]   Room:   2001/2001-02  IP Day:  0  Admit Date:  6/7/2020  2:29 AM    PCP:   Hafsa Arevalo MD  Code Status:  DNR-CC    Subjective:     C/C:   Chief Complaint   Patient presents with   Lauralyn Kiesha    Laceration     Interval History Status: improved. Less pain overall today  Denies cp/sob/n/v    Brief History:     Viki Hemphill is a 80 y.o. Non-/non  female who presents with Fall and Laceration   and is admitted to the hospital for the management of Fall at home, initial encounter.     This 80 yof was warming up coffee in the microwave and lost her balance and fell to ground. She sustained scapular fracture, and possibly dens fracture too. Has pain all over, especially in right shoulder    Review of Systems:     Constitutional:  negative for chills, fevers, sweats  Respiratory:  negative for cough, dyspnea on exertion, shortness of breath, wheezing  Cardiovascular:  negative for chest pain, chest pressure/discomfort, lower extremity edema, palpitations  Gastrointestinal:  negative for abdominal pain, constipation, diarrhea, nausea, vomiting  Neurological:  negative for dizziness, headache    Medications:      Allergies:  No Known Allergies    Current Meds:   Scheduled Meds:    calcium carb-cholecalciferol  1 tablet Oral BID    Vitamin D  2,000 Units Oral Daily    sodium chloride flush  10 mL Intravenous 2 times per day    enoxaparin  30 mg Subcutaneous Daily     Continuous Infusions:   PRN Meds: acetaminophen, sodium chloride flush, potassium chloride **OR** potassium alternative oral replacement **OR** potassium chloride, magnesium sulfate, acetaminophen **OR** acetaminophen, polyethylene glycol, promethazine **OR** ondansetron, nicotine, fentanNYL, HYDROcodone 5 mg - acetaminophen **OR** HYDROcodone 5 mg - tenderness in the calves  Skin:  no gross lesions, rashes, induration  Head tilted to left    Assessment:        Hospital Problems           Last Modified POA    * (Principal) Fall at home, initial encounter 6/7/2020 Yes    Dens fracture, closed, initial encounter ([facility] [address] 6/7/2020 Yes    Overview Signed 6/7/2020  6:39 AM by CORINNE Soto - CNP     chronic         Closed fracture of right scapula 6/7/2020 Yes    Closed fracture of multiple ribs 6/7/2020 Yes                Plan:        1. NS eval pending  2.  Sling for scapula fracture per ortho  3. covid swab for snf placement    Yogi Mejia DO  6/8/2020  1:48 PM

## 2020-06-08 NOTE — CARE COORDINATION
Lisa FITCH rounded. Pt has non surgical scapula fx S/P fall and rt forehead laceration. Continue to follow therapies recommendation and pt may require SNF. Therapy and SW informed.

## 2020-06-08 NOTE — CARE COORDINATION
Social Work-[facility] will admit after receiving COVID results and PT/OT eval. Corrie Lam

## 2020-06-09 VITALS
WEIGHT: 68.4 LBS | DIASTOLIC BLOOD PRESSURE: 84 MMHG | HEART RATE: 84 BPM | TEMPERATURE: 97.3 F | OXYGEN SATURATION: 94 % | RESPIRATION RATE: 16 BRPM | SYSTOLIC BLOOD PRESSURE: 152 MMHG | BODY MASS INDEX: 15.39 KG/M2 | HEIGHT: 56 IN

## 2020-06-09 PROBLEM — S01.81XA LACERATION OF FOREHEAD WITHOUT COMPLICATION: Status: ACTIVE | Noted: 2020-06-09

## 2020-06-09 PROBLEM — M43.6 LEFT TORTICOLLIS: Status: ACTIVE | Noted: 2020-06-09

## 2020-06-09 PROBLEM — R63.6 UNDERWEIGHT: Status: ACTIVE | Noted: 2020-06-09

## 2020-06-09 PROBLEM — S42.101A: Status: ACTIVE | Noted: 2020-06-09

## 2020-06-09 PROCEDURE — 97163 PT EVAL HIGH COMPLEX 45 MIN: CPT

## 2020-06-09 PROCEDURE — 6370000000 HC RX 637 (ALT 250 FOR IP): Performed by: NURSE PRACTITIONER

## 2020-06-09 PROCEDURE — 1200000000 HC SEMI PRIVATE

## 2020-06-09 PROCEDURE — 97166 OT EVAL MOD COMPLEX 45 MIN: CPT

## 2020-06-09 PROCEDURE — 97116 GAIT TRAINING THERAPY: CPT

## 2020-06-09 PROCEDURE — 97535 SELF CARE MNGMENT TRAINING: CPT

## 2020-06-09 PROCEDURE — 6360000002 HC RX W HCPCS: Performed by: NURSE PRACTITIONER

## 2020-06-09 PROCEDURE — G0378 HOSPITAL OBSERVATION PER HR: HCPCS

## 2020-06-09 PROCEDURE — 99239 HOSP IP/OBS DSCHRG MGMT >30: CPT | Performed by: INTERNAL MEDICINE

## 2020-06-09 PROCEDURE — 97530 THERAPEUTIC ACTIVITIES: CPT

## 2020-06-09 RX ORDER — HYDROCODONE BITARTRATE AND ACETAMINOPHEN 5; 325 MG/1; MG/1
1 TABLET ORAL EVERY 4 HOURS PRN
Qty: 12 TABLET | Refills: 0 | Status: SHIPPED | OUTPATIENT
Start: 2020-06-09 | End: 2020-06-12

## 2020-06-09 RX ADMIN — Medication 1 TABLET: at 08:45

## 2020-06-09 RX ADMIN — MELATONIN 2000 UNITS: at 08:45

## 2020-06-09 RX ADMIN — HYDROCODONE BITARTRATE AND ACETAMINOPHEN 2 TABLET: 5; 325 TABLET ORAL at 08:45

## 2020-06-09 RX ADMIN — ENOXAPARIN SODIUM 30 MG: 30 INJECTION SUBCUTANEOUS at 08:46

## 2020-06-09 ASSESSMENT — PAIN - FUNCTIONAL ASSESSMENT: PAIN_FUNCTIONAL_ASSESSMENT: PREVENTS OR INTERFERES WITH MANY ACTIVE NOT PASSIVE ACTIVITIES

## 2020-06-09 ASSESSMENT — PAIN DESCRIPTION - PROGRESSION: CLINICAL_PROGRESSION: NOT CHANGED

## 2020-06-09 ASSESSMENT — PAIN DESCRIPTION - FREQUENCY: FREQUENCY: INTERMITTENT

## 2020-06-09 ASSESSMENT — PAIN SCALES - GENERAL
PAINLEVEL_OUTOF10: 2
PAINLEVEL_OUTOF10: 7

## 2020-06-09 ASSESSMENT — PAIN DESCRIPTION - ONSET: ONSET: GRADUAL

## 2020-06-09 ASSESSMENT — PAIN DESCRIPTION - ORIENTATION: ORIENTATION: RIGHT

## 2020-06-09 ASSESSMENT — PAIN DESCRIPTION - DESCRIPTORS: DESCRIPTORS: ACHING;DISCOMFORT

## 2020-06-09 ASSESSMENT — PAIN DESCRIPTION - PAIN TYPE: TYPE: ACUTE PAIN

## 2020-06-09 ASSESSMENT — PAIN DESCRIPTION - LOCATION: LOCATION: ARM;ABDOMEN

## 2020-06-09 NOTE — CARE COORDINATION
Social Work-[facility] will accept patient today. [facility] to transport at 1 PM. Discussed with patient and Patricia lawson. PASSAR completed. Nurse to call report to [phone]. Orders faxed.  [address]

## 2020-06-09 NOTE — DISCHARGE SUMMARY
which revealed a nondisplaced fracture at the tip of the dens. She was evaluated by orthopedic surgery and neurosurgery. A sling has been placed on her right arm and she will follow-up with orthopedic surgery in 10 to 14 days, as there is no acute surgical intervention at this time. She was also evaluated by neurosurgery who recommended no acute intervention on the potential nondisplaced fracture at the tip of the dens. She will follow-up with them in the outpatient setting as well. I discussed with the patient's niece on day prior to discharge. She will be discharged to a subacute rehab facility today. Laboratory studies were within normal limits, along with her vital signs. Pain was well controlled. Significant therapeutic interventions:   Multiple radiographs and CT images as noted above  Orthopedic surgery evaluation-right arm sling and follow-up in 2 weeks. Neurosurgical evaluation- no acute intervention, follow-up in the outpatient setting    Significant Diagnostic Studies:   Labs / Micro:  CBC:   Lab Results   Component Value Date    WBC 8.4 06/08/2020    RBC 3.96 06/08/2020    HGB 12.5 06/08/2020    HCT 38.3 06/08/2020    MCV 96.7 06/08/2020    MCH 31.6 06/08/2020    MCHC 32.6 06/08/2020    RDW 12.9 06/08/2020     06/08/2020     CMP:    Lab Results   Component Value Date    GLUCOSE 110 06/08/2020     06/08/2020    K 3.9 06/08/2020     06/08/2020    CO2 26 06/08/2020    BUN 20 06/08/2020    CREATININE 0.58 06/08/2020    ANIONGAP 13 06/08/2020    LABGLOM >60 06/08/2020    GFRAA >60 06/08/2020    GFR      06/08/2020    GFR NOT REPORTED 06/08/2020    CALCIUM 9.4 06/08/2020        Radiology:  Xr Pelvis (1-2 Views)    Result Date: 6/7/2020  Likely impacted fracture involving the left femoral neck. Dedicated left hip views may be helpful in further evaluation.      Xr Shoulder Right (min 2 Views)    Result Date: 6/7/2020  Age-indeterminate fractures involving the right 3rd, 4th, and 5th ribs. Comminuted fracture involving the inferior aspect of the right scapula. Ct Head Wo Contrast    Result Date: 6/7/2020  Atrophy without acute intracranial abnormality. Right forehead soft tissue swelling and laceration. Ct Facial Bones Wo Contrast    Result Date: 6/7/2020  No acute traumatic injury of the facial bones. Nondisplaced fracture at the tip of the dens. Ct Chest Wo Contrast    Addendum Date: 6/7/2020    ADDENDUM: There is redemonstration of age-indeterminate right 3rd, 4th, and 5th rib fractures. There is also redemonstration of a comminuted right scapular fracture inferiorly. Result Date: 6/7/2020  Atelectasis in the lung bases. Large hiatal hernia. Remote compression deformity at L1 status post kyphoplasty. Age-indeterminate T5 compression deformity. Ct Cervical Spine Wo Contrast    Result Date: 6/7/2020  Potential nondisplaced fracture at the tip of the dens. Xr Chest Portable    Result Date: 6/7/2020  Chronic pulmonary change without acute cardiopulmonary process. Hiatal hernia. Ct Hip Left Wo Contrast    Result Date: 6/7/2020  No acute osseous or soft tissue abnormality. Degenerative change of the SI joints and left hip joint. Consultations:    Consults:     Final Specialist Recommendations/Findings:   IP CONSULT TO SOCIAL WORK  IP CONSULT TO NEUROSURGERY  IP CONSULT TO ORTHOPEDIC SURGERY      The patient was seen and examined on day of discharge and this discharge summary is in conjunction with any daily progress note from day of discharge.     Discharge plan:     Disposition: Skilled nursing facility    Physician Follow Up:     MD [place]elyn [place] [address], [phone]    Schedule an appointment as soon as possible for a visit in 1 week  For follow up after hospitalization    Karol Kincaid, [address] [facility]  [phone]    Call in 2 weeks  Follow up with orthopedic surgery in 10-14 days.    Arnoldo Alford MD  Colleen Lyon [address] [facility]  [phone]    Schedule an appointment as soon as possible for a visit in 2 weeks  For follow up with the neurosurgeon in 2 weeks       Requiring Further Evaluation/Follow Up POST HOSPITALIZATION/Incidental Findings:   - Follow up with  Orthopedic surgery and neurosurgery as noted above    Diet: regular diet    Activity: As tolerated with assistance    Instructions to Patient:   - Please follow up with orthopedic surgery (Dr. Carey Edwards) in 10-14 days.  - Please follow up with neurosurgery in the outpatient setting  - Please follow up with a neurologist (will likely need a referral) for evaluation of torticollis. Discharge Medications:      Medication List      START taking these medications    HYDROcodone-acetaminophen 5-325 MG per tablet  Commonly known as:  NORCO  Take 1 tablet by mouth every 4 hours as needed for Pain for up to 3 days. CONTINUE taking these medications    calcium-vitamin D 500-200 MG-UNIT per tablet  Commonly known as:  OSCAL-500     K-DUR PO     polyethylene glycol 17 g packet  Commonly known as:  GLYCOLAX     Vitamin D3 50 MCG (2000 UT) Caps           Where to Get Your Medications      You can get these medications from any pharmacy    Bring a paper prescription for each of these medications  · HYDROcodone-acetaminophen 5-325 MG per tablet         No discharge procedures on file. Time Spent on discharge is  37 mins in patient examination, evaluation, counseling as well as medication reconciliation, prescriptions for required medications, discharge plan and follow up. Electronically signed by   Peyman Graves DO  6/9/2020  12:10 PM      Thank you Dr. Cassia Purcell MD for the opportunity to be involved in this patient's care.

## 2020-06-09 NOTE — PROGRESS NOTES
Hysterectomy; and Appendectomy. Restrictions  Restrictions/Precautions  Restrictions/Precautions: General Precautions, Fall Risk  Position Activity Restriction  Other position/activity restrictions: Age-indeterminate fractures involving the right 3rd, 4th, and 5th ribs. Comminuted fracture involving the inferior aspect of the right scapula. Sling as needed for comfort. Subjective   General  Chart Reviewed: Yes  Patient assessed for rehabilitation services?: Yes  Family / Caregiver Present: No  Subjective  Subjective: pt reports pain in her stomach   Pain Assessment  Pain Level: 2  Response to Pain Intervention: Patient Satisfied  Social/Functional History  Social/Functional History  Lives With: Family(neice)  Type of Home: House  Home Layout: One level(with a basement)  Home Access: Stairs to enter with rails  Entrance Stairs - Number of Steps: 1  Bathroom Shower/Tub: Tub/Shower unit  Bathroom Toilet: Standard  Bathroom Equipment: Grab bars in shower, Tub transfer bench, Grab bars around toilet  Bathroom Accessibility: (does not take walker into bathroom )  Home Equipment: Rolling walker  Receives Help From: Family  ADL Assistance: [facility]: (pt can reheat food, simple tasks. Otherwise neice does homemaking/meals )  Ambulation Assistance: Independent(uses RW)  Transfer Assistance: Independent  Active : Yes  Leisure & Hobbies: puzzles  Additional Comments: h.o. falls (several recently, including prior to this admission)       Objective   Vision: Impaired  Vision Exceptions: Wears glasses at all times  Hearing: Exceptions to [facility]  Hearing Exceptions: Hard of hearing/hearing concerns;Bilateral hearing aid    Orientation  Overall Orientation Status: Within Functional Limits  Observation/Palpation  Posture: Poor(pt with torticollis- head rotated to left and laterally flexed to left)  Observation: pt is very thin/frail, rounded shoulders, forward head.  Has scapular and rib fx's on R side but is able to freely move R UE within somewhat functional range. Does not appear to need sling for inc comfort. Balance  Sitting Balance: Minimal assistance(pt initially with posterior and L lateral lean in sitting EOB. Progressed to CGA/min A)  Standing Balance: Moderate assistance(x2)  Functional Mobility  Functional - Mobility Device: Rolling Walker  Assist Level: Moderate assistance(x2 with walker to take ~3-4 steps forward and back from bed. Pt with initial heavy posterior lean, but able to correct somewhat with forward mob. Pt needing cues to keep legs/feet apart and to straighten knees. Pt )  Functional Mobility Comments: pt also able to transfer to chair with 2 assist and walker. Pt agreeable to sit up for awhile  ADL  Feeding: Setup;Minimal assistance; Moderate assistance  Grooming: Moderate assistance;Maximum assistance  UE Bathing: Maximum assistance  LE Bathing: Maximum assistance  UE Dressing: Maximum assistance  LE Dressing: Maximum assistance  Toileting: Maximum assistance  Additional Comments: pt is limited by dec. sitting and standing balance, dec. ROM of UEs and trunk/neck. Very guarded posture  Tone RUE  RUE Tone: Normotonic  Tone LUE  LUE Tone: Normotonic  Coordination  Movements Are Fluid And Coordinated: Yes     Bed mobility  Supine to Sit: Moderate assistance;2 Person assistance  Comment: up to chair  Transfers  Sit to stand: 2 Person assistance; Moderate assistance  Stand to sit: Moderate assistance;2 Person assistance  Transfer Comments: cues for safety with walker. Pt needing cues and assist to give self wider VINICIO; pt tends to have very narrow VINICIO and also needs cues/assist to shift wt forward as pt initially with standing has heavy posterior lean. Appears somewhat fearful. Cognition  Overall Cognitive Status: Exceptions  Following Commands:  Follows one step commands with repetition  Attention Span: Appears intact  Safety Judgement: Decreased awareness of need for assistance;Decreased awareness of need for safety  Problem Solving: Assistance required to generate solutions;Assistance required to implement solutions;Decreased awareness of errors;Assistance required to correct errors made  Insights: Decreased awareness of deficits  Initiation: Requires cues for some  Sequencing: Requires cues for some  Perception  Overall Perceptual Status: WFL     Sensation  Overall Sensation Status: (some numbness reported in legs. )        LUE AROM (degrees)  LUE AROM : WFL  RUE AROM (degrees)  RUE AROM : Exceptions(R shoulder AROM is ~0-80, other WFLs. )  LUE Strength  Gross LUE Strength: Exceptions to WFL(4-/5 L UE strength)  RUE Strength  RUE Strength Comment: R shoulder NT, elbow ~4-/5                   Plan   Plan  Times per week: 4-5x/week, 1-2x/day  Current Treatment Recommendations: Strengthening, Balance Training, Functional Mobility Training, Endurance Training, Equipment Evaluation, Education, & procurement, Patient/Caregiver Education & Training, Self-Care / ADL, Safety Education & Training, Positioning      Goals  Short term goals  Time Frame for Short term goals: by discharge, pt will  Short term goal 1: demo min A with ADL transfers with min cues for safety and RW/other approp DME  Short term goal 2: demo min A with toileting routine with min cues for safety at approp level  Short term goal 3: demo min A with UB ADLs following set up at approp level  Short term goal 4: demo SBA with grooming tasks following set up at approp level  Short term goal 5: demo min A with bed mob with rails/controls  Patient Goals   Patient goals : to go home       Therapy Time   Individual Concurrent Group Co-treatment   Time In 0945         Time Out 1037         Minutes 52         Timed Code Treatment Minutes: 38 Minutes     Patient would benefit from SNF for continued occupational therapy to increase independence with  ADL of bathing, dressing, toileting and grooming.  Writer recommending SNF

## 2020-06-09 NOTE — PROGRESS NOTES
[facility]    Progress Note    6/9/2020    1:51 PM    Name:   Jose Cruz Dillard  MRN:     [id number]     Acct:      [de-identified]   Room:   2001/2001-02  IP Day:  0  Admit Date:  6/7/2020  2:29 AM    PCP:   Gina Murdock MD  Code Status:  DNR-CC    Subjective:     C/C:   Chief Complaint   Patient presents with   Ranjeet Olivia    Laceration     Interval History Status: not changed     Pt seen and examined this morning. No acute events overnight. Sitting supine in bed. Reports pain in the right side of her back. No nausea or vomiting. Tolerating diet. Vials WNL. Brief History:     Viki Islas is a 80 y.o. Non-/non  female who presents with Fall and Laceration   and is admitted to the hospital for the management of Fall at home, initial encounter.     This 80 yof was warming up coffee in the microwave and lost her balance and fell to ground. She sustained scapular fracture, and possibly dens fracture too. Has pain all over, especially in right shoulder    Review of Systems:     Constitutional:  negative for chills, fevers, sweats  Respiratory:  negative for cough, dyspnea on exertion, shortness of breath, wheezing  Cardiovascular:  negative for chest pain, chest pressure/discomfort, lower extremity edema, palpitations  Gastrointestinal:  negative for abdominal pain, constipation, diarrhea, nausea, vomiting  MSK: reports right-sided back pain  Neurological:  negative for dizziness, headache    Medications:      Allergies:  No Known Allergies    Current Meds:   Scheduled Meds:    calcium carb-cholecalciferol  1 tablet Oral BID    Vitamin D  2,000 Units Oral Daily    sodium chloride flush  10 mL Intravenous 2 times per day     Continuous Infusions:   PRN Meds: acetaminophen, sodium chloride flush, potassium chloride **OR** potassium alternative oral replacement **OR** potassium chloride, magnesium sulfate, acetaminophen **OR** acetaminophen,